# Patient Record
Sex: MALE | Race: WHITE | Employment: FULL TIME | URBAN - METROPOLITAN AREA
[De-identification: names, ages, dates, MRNs, and addresses within clinical notes are randomized per-mention and may not be internally consistent; named-entity substitution may affect disease eponyms.]

---

## 2018-09-25 ENCOUNTER — HOSPITAL ENCOUNTER (EMERGENCY)
Age: 63
Discharge: HOME | End: 2018-09-25
Attending: EMERGENCY MEDICINE
Payer: COMMERCIAL

## 2018-09-25 VITALS
RESPIRATION RATE: 16 BRPM | HEART RATE: 58 BPM | TEMPERATURE: 97.8 F | WEIGHT: 226.41 LBS | BODY MASS INDEX: 30.67 KG/M2 | SYSTOLIC BLOOD PRESSURE: 139 MMHG | OXYGEN SATURATION: 95 % | HEIGHT: 72 IN | DIASTOLIC BLOOD PRESSURE: 91 MMHG

## 2018-09-25 DIAGNOSIS — S61.012A LACERATION OF LEFT THUMB WITHOUT FOREIGN BODY WITHOUT DAMAGE TO NAIL, INITIAL ENCOUNTER: Primary | ICD-10-CM

## 2018-09-25 PROCEDURE — 4500000022 HC ED LEVEL 2 PROCEDURE

## 2018-09-25 PROCEDURE — 99282 EMERGENCY DEPT VISIT SF MDM: CPT

## 2018-09-25 RX ORDER — CEPHALEXIN 500 MG/1
500 CAPSULE ORAL 3 TIMES DAILY
Qty: 30 CAPSULE | Refills: 0 | Status: SHIPPED | OUTPATIENT
Start: 2018-09-25 | End: 2018-10-05

## 2018-09-25 ASSESSMENT — ENCOUNTER SYMPTOMS
EYE PAIN: 0
COUGH: 0
SORE THROAT: 0
ABDOMINAL PAIN: 0
EYE REDNESS: 0
NAUSEA: 0
EYE DISCHARGE: 0
DIARRHEA: 0
SHORTNESS OF BREATH: 0
WHEEZING: 0
BACK PAIN: 0
RHINORRHEA: 0
VOMITING: 0

## 2018-09-25 ASSESSMENT — PAIN DESCRIPTION - LOCATION: LOCATION: FINGER (COMMENT WHICH ONE)

## 2018-09-25 ASSESSMENT — PAIN DESCRIPTION - ORIENTATION: ORIENTATION: LEFT

## 2018-09-25 ASSESSMENT — PAIN SCALES - GENERAL
PAINLEVEL_OUTOF10: 2
PAINLEVEL_OUTOF10: 0

## 2018-09-25 ASSESSMENT — PAIN DESCRIPTION - PAIN TYPE: TYPE: ACUTE PAIN

## 2018-09-25 NOTE — ED TRIAGE NOTES
Patient came to ER with complaints of laceration left thumb.   Patient smashed thumb between a beam.  Approximately 1.5 x3.5 cm

## 2018-09-25 NOTE — ED PROVIDER NOTES
is not nervous/anxious. PAST MEDICAL HISTORY     Past Medical History:   Diagnosis Date    Atrial fibrillation ([facility] [address]     Diabetes mellitus ([facility] [address]     GERD (gastroesophageal reflux disease)     Hyperlipidemia     Hypertension     Neuropathy     bilateral feet         SURGICAL HISTORY       Past Surgical History:   Procedure Laterality Date    ABLATION OF DYSRHYTHMIC FOCUS      june 2 2015    CARDIAC SURGERY      angioplasty 6/2/15         CURRENT MEDICATIONS       Previous Medications    ACETAMINOPHEN 650 MG TABS    Take 650 mg by mouth every 4 hours as needed    AMLODIPINE (NORVASC) 5 MG TABLET    Take 5 mg by mouth daily    ATENOLOL (TENORMIN) 25 MG TABLET    Take 25 mg by mouth daily    CILOSTAZOL (PLETAL) 100 MG TABLET    Take 100 mg by mouth 2 times daily    DILTIAZEM (CARDIZEM CD) 240 MG ER CAPSULE    Take 1 capsule by mouth daily    EXENATIDE (BYDUREON) 2 MG PEN    Inject 2 mg into the skin once a week Indications: gets every Tuesday    FENOFIBRATE 160 MG TABLET    Take 160 mg by mouth daily. GABAPENTIN (NEURONTIN) 100 MG CAPSULE    Take 600 mg by mouth 3 times daily     GLIPIZIDE (GLUCOTROL) 10 MG TABLET      Take 10 mg by mouth every morning (before breakfast)     HYDROCHLOROTHIAZIDE (HYDRODIURIL) 25 MG TABLET    Take 25 mg by mouth daily    HYDROCODONE-ACETAMINOPHEN (NORCO) 5-325 MG PER TABLET    Take 1 tablet by mouth every 6 hours as needed for Pain .     INSULIN GLARGINE (LANTUS) 100 UNIT/ML INJECTION    Inject 50 Units into the skin nightly     INSULIN LISPRO (HUMALOG PEN) 100 UNIT/ML INJECTION      Inject 5-15 Units into the skin 3 times daily (before meals)     LISINOPRIL (PRINIVIL;ZESTRIL) 40 MG TABLET    Take 40 mg by mouth daily    METFORMIN (GLUCOPHAGE) 500 MG TABLET    Take 500 mg by mouth 3 times daily (with meals)     METHOCARBAMOL (ROBAXIN-750) 750 MG TABLET    Take 1 tablet by mouth 4 times daily    PRAVASTATIN (PRAVACHOL) 80 MG TABLET      Take 40 mg by mouth daily thumb    Length (cm):  3.5  Repair type:     Repair type:  Simple  Pre-procedure details:     Preparation:  Patient was prepped and draped in usual sterile fashion  Exploration:     Hemostasis achieved with:  Direct pressure    Wound exploration: wound explored through full range of motion and entire depth of wound probed and visualized      Contaminated: yes    Treatment:     Area cleansed with:  Hibiclens, saline, Shur-Clens and soap and water    Amount of cleaning:  Extensive    Irrigation solution:  Tap water and sterile saline    Irrigation volume:  High volume    Irrigation method:  Tap    Visualized foreign bodies/material removed: no    Skin repair:     Repair method:  Sutures    Suture size:  4-0    Suture material:  Nylon    Number of sutures:  8  Approximation:     Approximation:  Close    Vermilion border: well-aligned    Post-procedure details:     Dressing:  Antibiotic ointment, sterile dressing and splint for protection    Patient tolerance of procedure: Tolerated well, no immediate complications        CRITICAL CARE TIME   N/A    CONSULTS:  None    EMERGENCY DEPARTMENT COURSE and DIFFERENTIAL DIAGNOSIS/MDM:   Vitals:    Vitals:    09/25/18 1622 09/25/18 1635   BP:  (!) 169/86   Pulse: 81    Resp: 16    Temp: 97.8 °F (36.6 °C)    TempSrc: Oral    SpO2: 94%    Weight: 226 lb 6.6 oz (102.7 kg)    Height: 6' (1.829 m)        Patient was given the following medications:  Medications - No data to display    Stable. His last tetanus was December 2017 so was not necessary to update this. FINAL IMPRESSION      1.  Laceration of left thumb without foreign body without damage to nail, initial encounter          DISPOSITION/PLAN   DISPOSITION Decision To Discharge 09/25/2018 05:13:53 PM      PATIENT REFERRED TO:  *[address] [phone]    In 3 days  For wound re-check    *[address]  [id number]

## 2020-11-03 PROBLEM — I10 HYPERTENSION: Status: RESOLVED | Noted: 2020-11-03 | Resolved: 2020-11-03
